# Patient Record
Sex: FEMALE | Race: WHITE | NOT HISPANIC OR LATINO | URBAN - METROPOLITAN AREA
[De-identification: names, ages, dates, MRNs, and addresses within clinical notes are randomized per-mention and may not be internally consistent; named-entity substitution may affect disease eponyms.]

---

## 2017-02-26 ENCOUNTER — OFFICE VISIT - RIVER FALLS (OUTPATIENT)
Dept: FAMILY MEDICINE | Facility: CLINIC | Age: 62
End: 2017-02-26

## 2017-05-15 ENCOUNTER — COMMUNICATION - RIVER FALLS (OUTPATIENT)
Dept: FAMILY MEDICINE | Facility: CLINIC | Age: 62
End: 2017-05-15

## 2017-05-15 ENCOUNTER — OFFICE VISIT - RIVER FALLS (OUTPATIENT)
Dept: FAMILY MEDICINE | Facility: CLINIC | Age: 62
End: 2017-05-15

## 2018-04-09 ENCOUNTER — OFFICE VISIT - RIVER FALLS (OUTPATIENT)
Dept: FAMILY MEDICINE | Facility: CLINIC | Age: 63
End: 2018-04-09

## 2018-04-10 LAB
CREAT SERPL-MCNC: 0.89 MG/DL (ref 0.5–0.99)
GLUCOSE BLD-MCNC: 93 MG/DL (ref 65–99)

## 2021-09-28 ENCOUNTER — OFFICE VISIT (OUTPATIENT)
Dept: URBAN - METROPOLITAN AREA CLINIC 51 | Facility: CLINIC | Age: 66
End: 2021-09-28
Payer: MEDICARE

## 2021-09-28 DIAGNOSIS — D48.1 NEOPLASM OF UNCERTAIN BEHAVIOR OF CONNCTV/SOFT TISS: Primary | ICD-10-CM

## 2021-09-28 PROCEDURE — 99204 OFFICE O/P NEW MOD 45 MIN: CPT | Performed by: OPHTHALMOLOGY

## 2021-09-28 PROCEDURE — 92285 EXTERNAL OCULAR PHOTOGRAPHY: CPT | Performed by: OPHTHALMOLOGY

## 2021-09-28 ASSESSMENT — INTRAOCULAR PRESSURE
OD: 18
OS: 18

## 2021-09-28 NOTE — IMPRESSION/PLAN
Impression: Neoplasm of uncertain behavior of connctv/soft tiss: D48.1. Plan: Neoplasm ( RLL margin ) Will seek authorization from insurance and will preform neoplasm excision with biopsy next visit.   Concerning for malignancy due to telangiectatic vessels and scattered pigmentation

## 2021-11-18 ENCOUNTER — PROCEDURE (OUTPATIENT)
Dept: URBAN - METROPOLITAN AREA CLINIC 44 | Facility: CLINIC | Age: 66
End: 2021-11-18
Payer: MEDICARE

## 2021-11-18 PROCEDURE — 67840 REMOVE EYELID LESION: CPT | Performed by: OPHTHALMOLOGY

## 2021-11-18 RX ORDER — NEOMYCIN SULFATE, POLYMYXIN B SULFATE AND DEXAMETHASONE 3.5; 10000; 1 MG/G; [USP'U]/G; MG/G
OINTMENT OPHTHALMIC
Qty: 1 | Refills: 1 | Status: ACTIVE
Start: 2021-11-18

## 2021-11-18 NOTE — IMPRESSION/PLAN
Impression: Neoplasm of uncertain behavior of connctv/soft tiss: D48.1. Plan: Neoplasm (RLL margin); Removed in office today. Specimen was sent for pathology. Patient instructed to apply DUNG 2x per day for 5-7 days on biopsy site, will contact with results. F/U PRN.

## 2022-02-11 VITALS — HEART RATE: 72 BPM | SYSTOLIC BLOOD PRESSURE: 110 MMHG | TEMPERATURE: 97.9 F | DIASTOLIC BLOOD PRESSURE: 66 MMHG

## 2022-02-11 VITALS
HEART RATE: 74 BPM | SYSTOLIC BLOOD PRESSURE: 88 MMHG | DIASTOLIC BLOOD PRESSURE: 60 MMHG | OXYGEN SATURATION: 98 % | TEMPERATURE: 98.2 F

## 2022-02-11 VITALS — TEMPERATURE: 97.8 F | DIASTOLIC BLOOD PRESSURE: 68 MMHG | HEART RATE: 68 BPM | SYSTOLIC BLOOD PRESSURE: 100 MMHG

## 2022-02-15 NOTE — TELEPHONE ENCOUNTER
Entered by Lo Mcfarland RN on February 12, 2019 10:32:20 AM CST  ---------------------  From: Lo Mcfarland RN   To: [facility]    Sent: 2/12/2019 10:32:20 AM CST  Subject: Medication Management     ** Submitted: **  Order:ergocalciferol (Vitamin D2 50,000 intl units (1.25 mg) oral capsule)  1 cap(s)  Oral  qmonth  Qty:  4 cap(s)        Refills:  1          SUJATA     Route To Pharmacy - [facility]    Signed by Lo Mcfarland RN  2/12/2019 10:31:00 AM    ** Submitted: **  Complete:ergocalciferol (Vitamin D2 50,000 intl units (1.25 mg) oral capsule)   Signed by Lo Mcfarland RN  2/12/2019 10:32:00 AM    ** Not Approved:  **  ergocalciferol (VIT D-2 CAP(ERGOCAL)1.25MG 50,000U)  TAKE 1 CAPSULE EVERY MONTH  Qty:  4 cap(s)        Days Supply:  0        Refills:  1          SUJATA     Route To Pharmacy - [facility]   Signed by Lo Mcfarland RN            ------------------------------------------  From: [facility]  To: Joselo Patel MD  Sent: February 12, 2019 12:38:25 AM CST  Subject: Medication Management  Due: February 13, 2019 12:38:25 AM CST    ** On Hold Pending Signature **  Drug: ergocalciferol (Vitamin D2 50,000 intl units (1.25 mg) oral capsule)  1 CAP(S) ORAL QMONTH  Quantity: 4 cap(s)     Days Supply: 0         Refills: 1  Substitutions Allowed  Notes from Pharmacy:     Dispensed Drug: ergocalciferol (Vitamin D2 50,000 intl units (1.25 mg) oral capsule)  TAKE 1 CAPSULE EVERY MONTH  Quantity: 4 cap(s)     Days Supply: 0         Refills: 1  Substitutions Allowed  Notes from Pharmacy:   ------------------------------------------Date of last office visit and reason:  4/9/18 Well Adult w/GTG      Date of last Med Check / Px:   4/9/18  Date of last labs pertaining to med:  4/9/18    RTC order in chart:  yes, due for annual visit 4/2019    For Protocol refill, has patient been contacted:  _

## 2022-02-15 NOTE — PROGRESS NOTES
Patient:   FELICITY MURPHY            MRN: [id number]            FIN: [id number]               Age:   63 years     Sex:  Female     :  1955   Associated Diagnoses:   Well adult; Colon polyps; Hypothyroidism (acquired); SK (seborrheic keratosis)   Author:   Joselo Patel MD      Visit Information      Date of Service: 2018 07:46 am  Performing Location: [facility]  Encounter#: [id number]      Primary Care Provider (PCP):  Joselo Patel MD    NPI# [id number]   Visit type:  Annual exam.    Source of history:  Self, Medical record.    History limitation:  None.       Chief Complaint   2018 7:53 AM CDT     annual px, discuss meds--feels she doesn't need Levothyroxine, due for colonoscopy      Well Adult History   Well Adult History             The patient presents for well adult exam.  The patient's general health status is described as good.  The patient's diet is described as balanced.  Exercise: routine, 5  times per week.  Associated symptoms consist of none.  Last menstrual period: menopausal.  Medical encounters: none.  Compliance problems: none.  Additional pertinent history: daily caffeine use, tobacco use none and occasional alcohol use.     Other concerns:     contraception:  n/a   Pap smear:  n/a   Mammogram:  up to date    colonoscopy:  due   immunizations:  due for shingles vaccine   lipid and diabetes screening:  up to date    other:  Has colonoscopy scheduled in 10 days, needs pre-procedure exam      Review of Systems   Constitutional:  No fever, No chills, No sweats, No weakness, No fatigue.    Eye:  No recent visual problem.    Ear/Nose/Mouth/Throat:  No decreased hearing, No nasal congestion, No sore throat.    Respiratory:  No shortness of breath, No cough.    Cardiovascular:  Negative, No chest pain, No palpitations, No peripheral edema.    Gastrointestinal:  No nausea, No vomiting, No diarrhea, No constipation, No heartburn.    Genitourinary:  No dysuria, No  change in urine stream.    Hematology/Lymphatics:  No bruising tendency, No bleeding tendency.    Endocrine:  No cold intolerance, No heat intolerance.    Immunologic:  Negative.    Musculoskeletal:  Joint pain, No back pain, No neck pain, No muscle pain.    Integumentary:  Skin lesion, No rash, No dryness.    Neurologic:  Alert and oriented X4, No headache.    Psychiatric:  No anxiety, No depression.      PHQ9 and CAGE reviewed and discussed with patient.       Health Status   Allergies:    Allergic Reactions (Selected)  Severity Not Documented  Bactrim (No reactions were documented)  Codeine (No reactions were documented)   Medications:  (Selected)   Prescriptions  Prescribed  Vitamin D2 50,000 intl units (1.25 mg) oral capsule: 1 cap(s) ( 50,000 International Unit ), po, qmonth, # 4 cap(s), 2 Refill(s), Type: Maintenance, Pharmacy: [facility]  levothyroxine 25 mcg (0.025 mg) oral tablet: See Instructions, Instructions: TAKE 1 TABLET DAILY (DUE FOR OFFICE VISIT BEFORE FURTHER REFILLS), # 90 tab(s), 2 Refill(s), Type: Soft Stop, Pharmacy: [facility]  Documented Medications  Documented  Calcium 600+D: po, Type: Maintenance  glucosamine: po, daily, Type: Maintenance   Problem list:    All Problems  Hypothyroidism (acquired) / SNOMED CT 9252500247 / Confirmed  Labial adhesions / SNOMED CT 440899344 / Confirmed  Menarche / SNOMED CT 79741124 / Confirmed  Osteoporosis / SNOMED CT 465614314 / Confirmed  Vitamin D deficiency / SNOMED CT 14091885 / Confirmed  Resolved: Pregnancy / SNOMED CT 301210561      Histories   Past Medical History:    Active  Menarche (91115929): Onset in  at 13 years.  Hypothyroidism (acquired) (5773607158)  Osteoporosis (527088174)  Vitamin D deficiency (46301427)  Labial adhesions (216245390)  Resolved  Pregnancy (396900619):  Resolved on 1991 at 36 years.   Family History:    Cancer  Mother ()  ALS - Amyotrophic lateral sclerosis  Father  ()     Procedure history:    Colonoscopy (SNOMED CT 056397874) on 2013 at 57 Years.  Comments:  2018 2:23 PM - Elena Marshall MA  Sedation:  MAC--for tortuous colon    2013 10:59 AM - Fabian Chua MA  Tubular adenoma repeat in 5 years  Colonoscopy (SNOMED CT 303067458) on 2005 at 50 Years.  Comments:  2013 4:38 PM - Elena Marshall MA  6mm sigmoid polyp w/ colonic diverticula noted.   Social History:        Alcohol Assessment            Current                     Comments:                      2015 - Angela Swain                     Occasional small glass of wine.      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      Home and Environment Assessment            Marital status: Single.      Exercise and Physical Activity Assessment            Exercise frequency: 4-6 times per week.  Exercise type: Bicycling, Walking, Weight lifting.      Sexual Assessment            Sexually active: No.        Physical Examination   Vital Signs   2018 7:53 AM CDT Temperature Tympanic 97.9 DegF    Peripheral Pulse Rate 72 bpm    Pulse Site Radial artery    HR Method Manual    Systolic Blood Pressure 110 mmHg    Diastolic Blood Pressure 66 mmHg    Mean Arterial Pressure 81 mmHg    BP Site Right arm    BP Method Manual      Measurements from flowsheet : Measurements   2018 7:53 AM CDT     Ht/Wt Measurement Refused by Patient?     Yes     General:  Alert and oriented, No acute distress.    Eye:  Pupils are equal, round and reactive to light, Extraocular movements are intact, Normal conjunctiva.    HENT:  Normocephalic, Tympanic membranes are clear, Oral mucosa is moist, No pharyngeal erythema, No sinus tenderness.    Neck:  Supple, Non-tender, No carotid bruit, No lymphadenopathy, No thyromegaly.    Respiratory:  Lungs are clear to auscultation,  Respirations are non-labored, Breath sounds are equal, No chest wall tenderness.    Cardiovascular:  Normal rate, Regular rhythm, No murmur, No gallop, Normal peripheral perfusion, No edema.    Breast:  No mass, No tenderness, No discharge.    Gastrointestinal:  Soft, Non-tender, Normal bowel sounds, No organomegaly.    Genitourinary:  No costovertebral angle tenderness.    Musculoskeletal:  Normal range of motion, Normal strength, No swelling, No deformity, tender MCP joints on right foot.    Integumentary:  Warm, Dry, No rash, 10 mm brown, flat lesion on right face.    Neurologic:  Alert, Oriented, Normal sensory, Normal motor function, No focal deficits, Normal deep tendon reflexes.    Psychiatric:  Cooperative, Appropriate mood & affect.       Review / Management   ECG interpretation:  Date:  4/9/2018.     Indication: pre-operative exam.     EKG findings   Rhythm: heart rate  53  beats/min, sinus bradycardia.     Axis: normal axis, normal configuration.     Intervals: normal.     P waves: normal.     QRS complex: normal.     ST-T-U complex: normal.     Interpretation: normal EKG.   .       Impression and Plan   Diagnosis     Well adult (PBS12-IZ Z00.00).     Colon polyps (LGF63-MI K63.5).     SK (seborrheic keratosis) (FUC24-SN L82.1).     Hypothyroidism (acquired) (NLF83-DP E03.4).     Course:  Progressing as expected.    Plan:  Counseled on health maintenance, diet, activity, BMI discussed, check TSH, consider stopping levothyroxine if in normal range.         Procedure: SK removed with , 1% lido with epi used for anesthesia.    Orders     Orders (Selected)   Outpatient Orders  Ordered (In Transit)  Basic Metabolic Panel* ([facility]): Specimen Type: Serum, Collection Date: 04/09/18 8:40:00 CDT  TSH* ([facility]): Specimen Type: Serum, Collection Date: 04/09/18 8:40:00 CDT.

## 2022-02-15 NOTE — LETTER
(Inserted Image. Unable to display)   April 10, 2019        FELICITY MURPHY  [address]   [place], [place] [id number]        Dear FELICITY,      Thank you for selecting [facility] ([facility]viously [facility]) for your healthcare needs.    Our records indicate you are due for the following services:    Non-Fasting Labs    If you had your labs done at another facility or with Direct Access Lab Testing at [facility], please bring in a copy of the results to your next visit, mail a copy, or drop off a copy of your results to your Healthcare Provider.  Annual Physical and Gynecologic Exam ~ Yearly wellness exams are important for your ongoing health and wellness.  This exam gives you the opportunity to meet with your Healthcare Provider to review your health, update immunizations and to recommend preventive screenings that you may be due for.  At your wellness visit, your health care provider will determine the need for a gynecologic exam and/or pap smear.     You are due for lab work and an office visit, please schedule the lab appointment 1 week before the office visit.  This will assure all results are available to discuss with your provider during your visit.    **It is very helpful if you bring your medication bottles to your appointment.  This assures we have all of your current medications, including strength and dosing information, documented accurately in your medical record.    To schedule an appointment or if you have further questions, please contact your primary clinic:   [facility]       [phone]   [facility]       [phone]              [facility]     [phone]      Powered by [employer]    Sincerely,    Joeslo Patel M.D.

## 2022-02-15 NOTE — PROGRESS NOTES
"   Patient:   FELICITY MURPHY            MRN: [id number]            FIN: [id number]               Age:   61 years     Sex:  Female     :  1955   Associated Diagnoses:   Pneumonia   Author:   Joselo Patel MD      Visit Information      Date of Service: 2017 10:01 am  Performing Location: [facility]  Encounter#: [id number]   Visit type:  New symptom.       Chief Complaint   2017 10:11 AM CST   feels like \"crap\", started with tickle in the throat on Tuesday, cough, fatigue, no sinus issues, irritation of the throat, dizziness, no known fever        History of Present Illness             The patient presents with cough.  The cough is described as hacking and productive.  The severity of the cough is moderate.  The cough remains unchanged.  The cough has lasted for 5 day(s).  The context of the cough: occurred in association with illness.  Exacerbating factors consist of lying flat and recent illness.  Associated symptoms consist of chills, fever, nasal congestion, rhinorrhea, shortness of breath, sore throat and denies chest pain.        Review of Systems   All other systems were reviewed and are negative.      Health Status   Medications:  (Selected)   Prescriptions  Prescribed  Vitamin D2 50,000 intl units (1.25 mg) oral capsule: 1 cap(s) ( 50,000 International Unit ), po, qmonth, # 3 cap(s), 3 Refill(s), Type: Maintenance, Pharmacy: [facility], 1 cap(s) po qmonth  azithromycin 250 mg oral tablet: 1 tab, PO, Daily, Instructions: Take 2 today then 1 daily for 4 days, # 6 tab(s), 0 Refill(s), Type: Maintenance, Pharmacy: [id number], 1 tab po daily,x5 day(s),Instr:Take 2 today then 1 daily for 4 days  benzonatate 200 mg oral capsule: 1 cap(s) ( 200 mg ), PO, TID, # 30 cap(s), 0 Refill(s), Type: Maintenance, Pharmacy: [id number], 1 cap(s) po tid,x10 day(s)  levothyroxine 25 mcg (0.025 mg) oral tablet: 1 tab(s) ( 25 mcg ), po, daily, # 90 " tab(s), 3 Refill(s), Type: Maintenance, Pharmacy: [facility], Pt is due for office visit before further refills. thanks, 1 tab(s) po daily,x90 day(s)  Documented Medications  Documented  Calcium 600+D: po, Type: Maintenance  glucosamine: po, daily, Type: Maintenance   Problem list:    All Problems  Hypothyroidism (acquired) / SNOMED CT 7378963516 / Confirmed  Labial Adhesions / ICD-9-.8 / Confirmed  Menarche / SNOMED CT 29429199 / Confirmed  Osteoporosis / SNOMED CT 510170721 / Confirmed  Vitamin D Deficiency / ICD-9-.9 / Confirmed      Histories   Past Medical History:    Active  Menarche (84640763): Onset in 1968 at 13 years.  Hypothyroidism (acquired) (0754229631)  Osteoporosis (943444534)  Vitamin D Deficiency (268.9)  Labial Adhesions (624.8)  Resolved  Pregnancy (066317736):  Resolved on 7/31/1991 at 36 years.   Procedure history:    Colonoscopy (SNOMED CT 316037871) on 2/18/2013 at 57 Years.  Comments:  5/6/2013 10:59 AM - Fabian Chua MA  Tubular adenoma repeat in 5 years  Colonoscopy (SNOMED CT 251371260) on 9/8/2005 at 50 Years.  Comments:  2/5/2013 4:38 PM - Elena Marshall MA  6mm sigmoid polyp w/ colonic diverticula noted.      Physical Examination   Vital Signs   2/26/2017 10:11 AM CST Temperature Tympanic 98.2 DegF    Peripheral Pulse Rate 74 bpm    Systolic Blood Pressure 88 mmHg  LOW    Diastolic Blood Pressure 60 mmHg    Mean Arterial Pressure 69 mmHg    BP Site Right arm    Oxygen Saturation 98 %      Measurements from flowsheet : Measurements   2/26/2017 10:11 AM CST   Ht/Wt Measurement Refused by Patient?     Yes     General:  Alert and oriented, No acute distress.    HENT:  Normocephalic, Tympanic membranes are clear.    Neck:  Supple, Non-tender, No lymphadenopathy.    Respiratory:  Respirations are non-labored.         Breath sounds: Right, Middle lobe.         Breath sounds: Left, Within normal limits.    Cardiovascular:  Normal rate, Regular rhythm.     Psychiatric:  Cooperative, Appropriate mood & affect.       Impression and Plan   Diagnosis     Pneumonia (RRG92-SI J18.9).     Course:  Progressing as expected.    Plan:  Emphasize oral fluids, Analgesics and antipyretics as needed, symptomatic care, and follow up if not improving, azithromycin.    Orders     Orders (Selected)   Prescriptions  Prescribed  azithromycin 250 mg oral tablet: 1 tab, PO, Daily, Instructions: Take 2 today then 1 daily for 4 days, # 6 tab(s), 0 Refill(s), Type: Maintenance, Pharmacy: [facility] [id number], 1 tab po daily,x5 day(s),Instr:Take 2 today then 1 daily for 4 days  benzonatate 200 mg oral capsule: 1 cap(s) ( 200 mg ), PO, TID, # 30 cap(s), 0 Refill(s), Type: Maintenance, Pharmacy: [facility] [id number], 1 cap(s) po tid,x10 day(s).

## 2022-02-15 NOTE — PROGRESS NOTES
Patient:   FELICITY MURPHY            MRN: [id number]            FIN: [id number]               Age:   62 years     Sex:  Female     :  1955   Associated Diagnoses:   Well adult; Fatigue; Hypothyroidism (acquired); Right knee pain   Author:   Joselo Patel MD      Visit Information      Date of Service: 05/15/2017 07:51 am  Performing Location: [facility]  Encounter#: [id number]      Primary Care Provider (PCP):  Joselo Patel MD    NPI# [id number]   Visit type:  Annual exam.    Source of history:  Self, Medical record.    History limitation:  None.       Chief Complaint   5/15/2017 7:52 AM CDT    annual px--had mammo and labs done in 2016.  check lump on scalp--increasing in size.  also c/o right knee pain and fatigue.      Well Adult History   Well Adult History             The patient presents for well adult exam.  The patient's general health status is described as good.  Exercise: routine.  Associated symptoms consist of none.  Last menstrual period: menopausal.  Medical encounters: none.  Compliance problems: none.  Additional pertinent history: occasional caffeine use, tobacco use none and occasional alcohol use.     Other concerns:     contraception:  n/a   Pap smear:  n/a   Mammogram: due   colonoscopy:  due    immunizations:  up to date    lipid and diabetes screening:  up to date    other:  Fatigue for the last several months, worsening right knee pain which is worse with activity, new epidermoid cyst on scalp      Review of Systems   Constitutional:  No fever, No chills, No sweats, No weakness, No fatigue.    Eye:  No recent visual problem.    Ear/Nose/Mouth/Throat:  No decreased hearing, No nasal congestion, No sore throat.    Respiratory:  No shortness of breath, No cough.    Cardiovascular:  Negative, No chest pain, No palpitations, No peripheral edema.    Gastrointestinal:  No nausea, No vomiting, No diarrhea, No constipation, No heartburn.    Genitourinary:  No  dysuria, No change in urine stream.    Hematology/Lymphatics:  No bruising tendency, No bleeding tendency.    Endocrine:  No cold intolerance, No heat intolerance.    Immunologic:  Negative.    Musculoskeletal:  No back pain, No neck pain, No joint pain, No muscle pain.    Integumentary:  No rash, No dryness, No skin lesion.    Neurologic:  Alert and oriented X4, No headache.    Psychiatric:  No anxiety, No depression.      PHQ9 and CAGE reviewed and discussed with patient.       Health Status   Allergies:    Allergic Reactions (Selected)  Severity Not Documented  Bactrim (No reactions were documented)  Codeine (No reactions were documented)   Medications:  (Selected)   Prescriptions  Prescribed  Vitamin D2 50,000 intl units (1.25 mg) oral capsule: 1 cap(s) ( 50,000 International Unit ), po, qmonth, # 3 cap(s), 3 Refill(s), Type: Maintenance, Pharmacy: [facility], 1 cap(s) po qmonth  levothyroxine 25 mcg (0.025 mg) oral tablet: 1 tab(s) ( 25 mcg ), po, daily, # 90 tab(s), 3 Refill(s), Type: Maintenance, Pharmacy: [facility], Pt is due for office visit before further refills. thanks, 1 tab(s) po daily,x90 day(s)  Documented Medications  Documented  Calcium 600+D: po, Type: Maintenance  glucosamine: po, daily, Type: Maintenance   Problem list:    All Problems  Hypothyroidism (acquired) / SNOMED CT 1427152510 / Confirmed  Labial Adhesions / ICD-9-.8 / Confirmed  Menarche / SNOMED CT 45116498 / Confirmed  Osteoporosis / SNOMED CT 372026840 / Confirmed  Vitamin D Deficiency / ICD-9-.9 / Confirmed  Resolved: Pregnancy / SNOMED CT 751581435      Histories   Past Medical History:    Active  Menarche (38228723): Onset in 1968 at 13 years.  Hypothyroidism (acquired) (3834445832)  Osteoporosis (217770412)  Vitamin D Deficiency (268.9)  Labial Adhesions (624.8)  Resolved  Pregnancy (222038502):  Resolved on 7/31/1991 at 36 years.   Family History:    Cancer  Mother  ()  ALS - Amyotrophic lateral sclerosis  Father ()     Procedure history:    Colonoscopy (SNOMED CT 210933571) on 2013 at 57 Years.  Comments:  2013 10:59 AM - Fabian Chua MA  Tubular adenoma repeat in 5 years  Colonoscopy (SNOMED CT 771062066) on 2005 at 50 Years.  Comments:  2013 4:38 PM - Joanna PEREZ, Elena  6mm sigmoid polyp w/ colonic diverticula noted.   Social History:        Alcohol Assessment            Current                     Comments:                      2015 - Angela Swain                     Occasional small glass of wine.      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      Home and Environment Assessment            Marital status: Single.      Exercise and Physical Activity Assessment: Regular exercise            Exercise frequency: 5-6 times/week.  Exercise type: Bicycling, Weight lifting.        Physical Examination   Vital Signs   5/15/2017 7:52 AM CDT Temperature Tympanic 97.8 DegF  LOW    Peripheral Pulse Rate 68 bpm    Pulse Site Radial artery    HR Method Manual    Systolic Blood Pressure 100 mmHg    Diastolic Blood Pressure 68 mmHg    Mean Arterial Pressure 79 mmHg    BP Site Right arm    BP Method Manual      Measurements from flowsheet : Measurements   5/15/2017 7:52 AM CDT    Ht/Wt Measurement Refused by Patient?     Yes     General:  Alert and oriented, No acute distress.    Eye:  Pupils are equal, round and reactive to light, Extraocular movements are intact, Normal conjunctiva.    HENT:  Normocephalic, Tympanic membranes are clear, Oral mucosa is moist, No pharyngeal erythema, No sinus tenderness.    Neck:  Supple, Non-tender, No carotid bruit, No lymphadenopathy, No thyromegaly.    Respiratory:  Lungs are clear to auscultation, Respirations are non-labored, Breath sounds are equal, No chest wall  tenderness.    Cardiovascular:  Normal rate, Regular rhythm, No murmur, No gallop, Normal peripheral perfusion, No edema.    Breast:  No mass, No tenderness, No discharge.    Gastrointestinal:  Soft, Non-tender, Normal bowel sounds, No organomegaly.    Genitourinary:  No costovertebral angle tenderness.    Musculoskeletal:  Normal range of motion, Normal strength, No swelling, No deformity.         Lower extremity exam: Hip ( Bilateral, Within normal limits ), Knee ( Right, Lateral, Patella, Anterior cruciate ligament, Posterior cruciate ligament, Medial collateral ligament, Lateral collateral ligament, Intact, Tenderness, Strength  5 /5 ).    Integumentary:  Warm, Dry, No rash.    Neurologic:  Alert, Oriented, Normal sensory, Normal motor function, No focal deficits, Normal deep tendon reflexes.    Psychiatric:  Cooperative, Appropriate mood & affect.       Review / Management   Radiology results   X-ray, Bilateral standing knees , Reveals no acute disease process      Impression and Plan   Diagnosis     Well adult (BQN09-AF Z00.00).     Fatigue (KVD93-NK R53.83).     Hypothyroidism (acquired) (AQG81-YV E03.4).     Right knee pain (JIT00-RM M25.561).     Course:  Progressing as expected.    Plan:  Counseled on health maintenance, diet, activity, BMI discussed, check TSH, continue with current levothyroxine, referral to PT for patellar pain.    Orders     Orders (Selected)   Outpatient Orders  Ordered  Physical Therapy (Request): Instructions: suspect patellar tracking issue, Right knee pain  Completed  Hemoglobin (Request): Right knee pain  Fatigue  Hypothyroidism (acquired)  TSH (Request): Right knee pain  Fatigue  Hypothyroidism (acquired).